# Patient Record
(demographics unavailable — no encounter records)

---

## 2024-10-30 NOTE — ASSESSMENT
[FreeTextEntry1] : The patient is an 84-year-old G2, P2 postmenopausal white female of Ashkenazi [place] descent. She underwent menarche at age 11 and had her first child at age 27. She underwent menopause at age 55 and never took any hormone replacement therapy. She has a family history with her maternal cousin who had breast cancer in her 60s. Another maternal cousin had melanoma at age 88. 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 She had 18 negative right axillary lymph nodes removed. 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 She underwent external beam radiation and never took any endocrine therapy. The patient underwent her last bilateral mammography and ultrasound on ???????? November 7, 2023 which were reviewed on CD-ROM from [facility] showing postop changes in the right breast with a questionable subcentimeter circumscribed mass seen in the retroareolar region of the right breast.  Compression views and targeted right breast ultrasound were recommended and performed on November 10, 2023 and this retroareolar finding dissipated.  On exam today, I cannot feel any evidence of recurrence in the right breast and she has no suspicious findings in the left breast.  She does have a chronic nipple inversion on the right side which may have accounted for the retroareolar finding.  The patient was reassured and should continue yearly follow-up in November 2025. She will be due for her next bilateral mammography and ultrasound in ?????? November 2025 and she was given prescriptions.

## 2024-10-30 NOTE — PHYSICAL EXAM
[Normocephalic] : normocephalic [Atraumatic] : atraumatic [EOMI] : extra ocular movement intact [Supple] : supple [No Supraclavicular Adenopathy] : no supraclavicular adenopathy [No Cervical Adenopathy] : no cervical adenopathy [Examined in the supine and seated position] : examined in the supine and seated position [No dominant masses] : no dominant masses in right breast  [No dominant masses] : no dominant masses left breast [No Nipple Retraction] : no left nipple retraction [No Nipple Discharge] : no left nipple discharge [Breast Mass Right Breast ___cm] : no masses [Breast Mass Left Breast ___cm] : no masses [Breast Nipple Inversion] : nipples not inverted [Breast Nipple Retraction] : nipples not retracted [Breast Nipple Flattening] : nipples not flattened [Breast Nipple Fissures] : nipples not fissured [Breast Abnormal Lactation (Galactorrhea)] : no galactorrhea [Breast Abnormal Secretion Bloody Fluid] : no bloody discharge [Breast Abnormal Secretion Serous Fluid] : no serous discharge [Breast Abnormal Secretion Opalescent Fluid] : no milky discharge [No Axillary Lymphadenopathy] : no left axillary lymphadenopathy [No Edema] : no edema [No Rashes] : no rashes [No Ulceration] : no ulceration [de-identified] : On exam, the patient has small B-cup breasts.  On palpation, she has a well-healed right breast partial mastectomy scar with no evidence of recurrence.  She has no axillary, supraclavicular, or cervical adenopathy. [de-identified] : Status post partial mastectomy with no evidence of recurrence

## 2024-10-30 NOTE — REASON FOR VISIT
[Follow-Up: _____] : a [unfilled] follow-up visit [FreeTextEntry1] : The patient comes in with a family history of breast cancer and has Ashkenazi Confucianism heritage.  She herself underwent a right breast partial mastectomy in June 1993 at [facility] for a 5 mm intermediate grade DCIS with 18 negative nodes which was ER positive.  She underwent right breast external beam radiation but never took endocrine therapy.  She comes in for routine yearly clinical exams and gets yearly mammography and ultrasound. [FreeTextEntry2] : June 9, 1993

## 2024-10-30 NOTE — HISTORY OF PRESENT ILLNESS
[FreeTextEntry1] : The patient is an 84-year-old G2, P2 postmenopausal white female of Ashkenazi Ho[place]aoism descent.  She underwent menarche at age 11 and had her first child at age 27.  She underwent menopause at age 55 and never took any hormone replacement therapy.  She has a family history with her maternal cousin who had breast cancer in her 60s.  Another maternal cousin had melanoma at age 88.  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  She had 18 negative right axillary lymph nodes removed.  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  She underwent external beam radiation and never took any endocrine therapy.  She comes in for routine exam and continues to get yearly mammography and ultrasound.

## 2024-11-14 NOTE — HISTORY OF PRESENT ILLNESS
[FreeTextEntry1] : The patient is an 84-year-old G2, P2 postmenopausal white female of Ashkenazi Sabianist descent.  She underwent menarche at age 11 and had her first child at age 27.  She underwent menopause at age 55 and never took any hormone replacement therapy.  She has a family history with her maternal cousin who had breast cancer in her 60s.  Another maternal cousin had melanoma at age 88.  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  She had 18 negative right axillary lymph nodes removed.  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  She underwent external beam radiation and never took any endocrine therapy.  She comes in for routine exam and continues to get yearly mammography and ultrasound.

## 2024-11-14 NOTE — REASON FOR VISIT
[Follow-Up: _____] : a [unfilled] follow-up visit [FreeTextEntry1] : The patient comes in with a family history of breast cancer and has Ashkenazi Christianity heritage.  She herself underwent a right breast partial mastectomy in June 1993 at [facility] for a 5 mm intermediate grade DCIS with 18 negative nodes which was ER positive.  She underwent right breast external beam radiation but never took endocrine therapy.  She comes in for routine yearly clinical exams and gets yearly mammography and ultrasound. [FreeTextEntry2] : June 9, 1993

## 2024-11-14 NOTE — ASSESSMENT
[FreeTextEntry1] : The patient is an 84-year-old G2, P2 postmenopausal white female of Ashkenazi Islam descent. She underwent menarche at age 11 and had her first child at age 27. She underwent menopause at age 55 and never took any hormone replacement therapy. She has a family history with her maternal cousin who had breast cancer in her 60s. Another maternal cousin had melanoma at age 88. 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 She had 18 negative right axillary lymph nodes removed. 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 She underwent external beam radiation and never took any endocrine therapy. The patient underwent her last bilateral mammography and ultrasound on ???????? November 7, 2023 which were reviewed on CD-ROM from [facility] showing postop changes in the right breast with a questionable subcentimeter circumscribed mass seen in the retroareolar region of the right breast.  Compression views and targeted right breast ultrasound were recommended and performed on November 10, 2023 and this retroareolar finding dissipated.  On exam today, I cannot feel any evidence of recurrence in the right breast and she has no suspicious findings in the left breast.  She does have a chronic nipple inversion on the right side which may have accounted for the retroareolar finding.  The patient was reassured and should continue yearly follow-up in November 2025. She will be due for her next bilateral mammography and ultrasound in ?????? November 2025 and she was given prescriptions.

## 2024-11-14 NOTE — PHYSICAL EXAM
[Normocephalic] : normocephalic [Atraumatic] : atraumatic [EOMI] : extra ocular movement intact [Supple] : supple [No Supraclavicular Adenopathy] : no supraclavicular adenopathy [No Cervical Adenopathy] : no cervical adenopathy [Examined in the supine and seated position] : examined in the supine and seated position [No dominant masses] : no dominant masses in right breast  [No dominant masses] : no dominant masses left breast [No Nipple Retraction] : no left nipple retraction [No Nipple Discharge] : no left nipple discharge [Breast Mass Right Breast ___cm] : no masses [Breast Mass Left Breast ___cm] : no masses [Breast Nipple Inversion] : nipples not inverted [Breast Nipple Retraction] : nipples not retracted [Breast Nipple Flattening] : nipples not flattened [Breast Nipple Fissures] : nipples not fissured [Breast Abnormal Lactation (Galactorrhea)] : no galactorrhea [Breast Abnormal Secretion Bloody Fluid] : no bloody discharge [Breast Abnormal Secretion Serous Fluid] : no serous discharge [Breast Abnormal Secretion Opalescent Fluid] : no milky discharge [No Axillary Lymphadenopathy] : no left axillary lymphadenopathy [No Edema] : no edema [No Rashes] : no rashes [No Ulceration] : no ulceration [de-identified] : On exam, the patient has small B-cup breasts.  On palpation, she has a well-healed right breast partial mastectomy scar with no evidence of recurrence.  She has no axillary, supraclavicular, or cervical adenopathy. [de-identified] : Status post partial mastectomy with no evidence of recurrence

## 2024-11-14 NOTE — REASON FOR VISIT
[Follow-Up: _____] : a [unfilled] follow-up visit [FreeTextEntry1] : The patient comes in with a family history of breast cancer and has Ashkenazi Confucianism heritage.  She herself underwent a right breast partial mastectomy in June 1993 at [facility] for a 5 mm intermediate grade DCIS with 18 negative nodes which was ER positive.  She underwent right breast external beam radiation but never took endocrine therapy.  She comes in for routine yearly clinical exams and gets yearly mammography and ultrasound. [FreeTextEntry2] : June 9, 1993

## 2024-11-14 NOTE — HISTORY OF PRESENT ILLNESS
[FreeTextEntry1] : The patient is an 84-year-old G2, P2 postmenopausal white female of Ashkenazi Yazdanism descent.  She underwent menarche at age 11 and had her first child at age 27.  She underwent menopause at age 55 and never took any hormone replacement therapy.  She has a family history with her maternal cousin who had breast cancer in her 60s.  Another maternal cousin had melanoma at age 88.  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  She had 18 negative right axillary lymph nodes removed.  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  She underwent external beam radiation and never took any endocrine therapy.  She comes in for routine exam and continues to get yearly mammography and ultrasound.

## 2024-11-14 NOTE — ASSESSMENT
[FreeTextEntry1] : The patient is an 84-year-old G2, P2 postmenopausal white female of Ashkenazi Muslim descent. She underwent menarche at age 11 and had her first child at age 27. She underwent menopause at age 55 and never took any hormone replacement therapy. She has a family history with her maternal cousin who had breast cancer in her 60s. Another maternal cousin had melanoma at age 88. 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 She had 18 negative right axillary lymph nodes removed. 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 She underwent external beam radiation and never took any endocrine therapy. The patient underwent her last bilateral mammography and ultrasound on ???????? November 7, 2023 which were reviewed on CD-ROM from [facility] showing postop changes in the right breast with a questionable subcentimeter circumscribed mass seen in the retroareolar region of the right breast.  Compression views and targeted right breast ultrasound were recommended and performed on November 10, 2023 and this retroareolar finding dissipated.  On exam today, I cannot feel any evidence of recurrence in the right breast and she has no suspicious findings in the left breast.  She does have a chronic nipple inversion on the right side which may have accounted for the retroareolar finding.  The patient was reassured and should continue yearly follow-up in November 2025. She will be due for her next bilateral mammography and ultrasound in ?????? November 2025 and she was given prescriptions.

## 2024-11-14 NOTE — PHYSICAL EXAM
[Normocephalic] : normocephalic [Atraumatic] : atraumatic [EOMI] : extra ocular movement intact [Supple] : supple [No Supraclavicular Adenopathy] : no supraclavicular adenopathy [No Cervical Adenopathy] : no cervical adenopathy [Examined in the supine and seated position] : examined in the supine and seated position [No dominant masses] : no dominant masses in right breast  [No dominant masses] : no dominant masses left breast [No Nipple Retraction] : no left nipple retraction [No Nipple Discharge] : no left nipple discharge [Breast Mass Right Breast ___cm] : no masses [Breast Mass Left Breast ___cm] : no masses [Breast Nipple Inversion] : nipples not inverted [Breast Nipple Retraction] : nipples not retracted [Breast Nipple Flattening] : nipples not flattened [Breast Nipple Fissures] : nipples not fissured [Breast Abnormal Lactation (Galactorrhea)] : no galactorrhea [Breast Abnormal Secretion Bloody Fluid] : no bloody discharge [Breast Abnormal Secretion Serous Fluid] : no serous discharge [Breast Abnormal Secretion Opalescent Fluid] : no milky discharge [No Axillary Lymphadenopathy] : no left axillary lymphadenopathy [No Edema] : no edema [No Rashes] : no rashes [No Ulceration] : no ulceration [de-identified] : On exam, the patient has small B-cup breasts.  On palpation, she has a well-healed right breast partial mastectomy scar with no evidence of recurrence.  She has no axillary, supraclavicular, or cervical adenopathy. [de-identified] : Status post partial mastectomy with no evidence of recurrence